# Patient Record
Sex: MALE | Race: WHITE | NOT HISPANIC OR LATINO | Employment: FULL TIME | URBAN - METROPOLITAN AREA
[De-identification: names, ages, dates, MRNs, and addresses within clinical notes are randomized per-mention and may not be internally consistent; named-entity substitution may affect disease eponyms.]

---

## 2019-08-28 ENCOUNTER — OFFICE VISIT (OUTPATIENT)
Dept: URGENT CARE | Facility: CLINIC | Age: 39
End: 2019-08-28
Payer: COMMERCIAL

## 2019-08-28 VITALS
SYSTOLIC BLOOD PRESSURE: 127 MMHG | OXYGEN SATURATION: 98 % | WEIGHT: 150 LBS | BODY MASS INDEX: 20.32 KG/M2 | HEART RATE: 64 BPM | TEMPERATURE: 98 F | DIASTOLIC BLOOD PRESSURE: 70 MMHG | HEIGHT: 72 IN

## 2019-08-28 DIAGNOSIS — S62.346A CLOSED NONDISPLACED FRACTURE OF BASE OF FIFTH METACARPAL BONE OF RIGHT HAND, INITIAL ENCOUNTER: Primary | ICD-10-CM

## 2019-08-28 PROCEDURE — 3008F BODY MASS INDEX DOCD: CPT | Mod: CPTII,S$GLB,, | Performed by: PHYSICIAN ASSISTANT

## 2019-08-28 PROCEDURE — 99203 PR OFFICE/OUTPT VISIT, NEW, LEVL III, 30-44 MIN: ICD-10-PCS | Mod: S$GLB,,, | Performed by: PHYSICIAN ASSISTANT

## 2019-08-28 PROCEDURE — 99203 OFFICE O/P NEW LOW 30 MIN: CPT | Mod: S$GLB,,, | Performed by: PHYSICIAN ASSISTANT

## 2019-08-28 PROCEDURE — 3008F PR BODY MASS INDEX (BMI) DOCUMENTED: ICD-10-PCS | Mod: CPTII,S$GLB,, | Performed by: PHYSICIAN ASSISTANT

## 2019-08-28 PROCEDURE — 73130 XR HAND COMPLETE 3 VIEW RIGHT: ICD-10-PCS | Mod: RT,S$GLB,, | Performed by: RADIOLOGY

## 2019-08-28 PROCEDURE — 73130 X-RAY EXAM OF HAND: CPT | Mod: RT,S$GLB,, | Performed by: RADIOLOGY

## 2019-08-28 RX ORDER — HYDROCODONE BITARTRATE AND ACETAMINOPHEN 5; 325 MG/1; MG/1
1 TABLET ORAL EVERY 6 HOURS PRN
Qty: 10 TABLET | Refills: 0 | Status: SHIPPED | OUTPATIENT
Start: 2019-08-28 | End: 2020-01-02 | Stop reason: ALTCHOICE

## 2019-08-29 ENCOUNTER — TELEPHONE (OUTPATIENT)
Dept: ORTHOPEDICS | Facility: CLINIC | Age: 39
End: 2019-08-29

## 2019-08-29 NOTE — TELEPHONE ENCOUNTER
Patient was called as another attempt to schedule an appointment. Home phone did not have a mailbox.

## 2019-08-29 NOTE — TELEPHONE ENCOUNTER
Patient was called as a follow up regarding his fracture and scheduling him to see one of our providers. I was able to leave a message and will make another attempt to contact the patient.

## 2019-08-29 NOTE — PROGRESS NOTES
Subjective:       Patient ID: Daysi Dickey is a 38 y.o. male.    Vitals:  height is 6' (1.829 m) and weight is 68 kg (150 lb). His temperature is 98.1 °F (36.7 °C). His blood pressure is 127/70 and his pulse is 64. His oxygen saturation is 98%.     Chief Complaint: Hand Injury    Pt is a right-handed male who presents today with right hand pain. He states that a week and a half ago he punched a wall with his right hand, he is c/o pain and swelling. No wounds.       Hand Injury    His dominant hand is their right hand. The incident occurred 5 to 7 days ago. The incident occurred at home. The injury mechanism was a direct blow. The pain is present in the right hand. The pain is at a severity of 6/10. The pain is mild. The pain has been worsening since the incident. The symptoms are aggravated by palpation and movement. He has tried nothing for the symptoms. The treatment provided mild relief.       Constitution: Negative for fatigue.   HENT: Negative for facial swelling and facial trauma.    Neck: Negative for neck stiffness.   Cardiovascular: Negative for chest trauma.   Eyes: Negative for eye trauma, double vision and blurred vision.   Gastrointestinal: Negative for abdominal trauma, abdominal pain and rectal bleeding.   Genitourinary: Negative for hematuria, genital trauma and pelvic pain.   Musculoskeletal: Positive for pain. Negative for trauma, joint swelling, abnormal ROM of joint and pain with walking.   Skin: Negative for color change, wound, abrasion and laceration.   Neurological: Negative for dizziness, history of vertigo, light-headedness, coordination disturbances, altered mental status and loss of consciousness.   Hematologic/Lymphatic: Negative for history of bleeding disorder.   Psychiatric/Behavioral: Negative for altered mental status.       Objective:      Physical Exam   Constitutional: He is oriented to person, place, and time. He appears well-developed and well-nourished. He is cooperative.   Non-toxic appearance. He does not appear ill. No distress.   HENT:   Head: Normocephalic and atraumatic. Head is without abrasion, without contusion and without laceration.   Right Ear: Hearing, tympanic membrane, external ear and ear canal normal. No hemotympanum.   Left Ear: Hearing, tympanic membrane, external ear and ear canal normal. No hemotympanum.   Nose: Nose normal. No mucosal edema, rhinorrhea or nasal deformity. No epistaxis. Right sinus exhibits no maxillary sinus tenderness and no frontal sinus tenderness. Left sinus exhibits no maxillary sinus tenderness and no frontal sinus tenderness.   Mouth/Throat: Uvula is midline, oropharynx is clear and moist and mucous membranes are normal. No trismus in the jaw. Normal dentition. No uvula swelling. No posterior oropharyngeal erythema.   Eyes: Pupils are equal, round, and reactive to light. Conjunctivae, EOM and lids are normal. Right eye exhibits no discharge. Left eye exhibits no discharge. No scleral icterus.   Sclera clear bilat   Neck: Trachea normal, normal range of motion, full passive range of motion without pain and phonation normal. Neck supple. No spinous process tenderness and no muscular tenderness present. No neck rigidity. No tracheal deviation present.   Cardiovascular: Normal rate, regular rhythm, normal heart sounds, intact distal pulses and normal pulses.   Pulmonary/Chest: Effort normal and breath sounds normal. No respiratory distress.   Abdominal: Soft. Normal appearance and bowel sounds are normal. He exhibits no distension, no pulsatile midline mass and no mass. There is no tenderness.   Musculoskeletal: Normal range of motion. He exhibits no edema.        Right wrist: Normal. He exhibits normal range of motion, no tenderness, no bony tenderness, no swelling, no effusion, no crepitus, no deformity and no laceration.        Right hand: He exhibits tenderness, bony tenderness, deformity and swelling. He exhibits normal range of motion,  normal two-point discrimination, normal capillary refill and no laceration. Normal sensation noted. Normal strength noted.        Hands:  Neurological: He is alert and oriented to person, place, and time. He has normal strength. No cranial nerve deficit or sensory deficit. He exhibits normal muscle tone. He displays no seizure activity. Coordination normal. GCS eye subscore is 4. GCS verbal subscore is 5. GCS motor subscore is 6.   Skin: Skin is warm, dry and intact. Capillary refill takes less than 2 seconds. No abrasion, no bruising, no burn, no ecchymosis and no laceration noted. He is not diaphoretic. No pallor.   Psychiatric: He has a normal mood and affect. His speech is normal and behavior is normal. Judgment and thought content normal. Cognition and memory are normal.   Nursing note and vitals reviewed.        Put patient in a temporary knuckle orthosis to keep in place until seen by hand surgery, to serve as ulnar gutter brace with good immobilization of right 5th finger.  Instructed to call tomorrow morning to schedule appointment with hand surgery, I placed an urgent referral.      Assessment:       1. Closed nondisplaced fracture of base of fifth metacarpal bone of right hand, initial encounter        Plan:         Closed nondisplaced fracture of base of fifth metacarpal bone of right hand, initial encounter  -     X-Ray Hand 3 View Right; Future; Expected date: 08/28/2019  -     Ambulatory referral to Hand Surgery  -     HYDROcodone-acetaminophen (NORCO) 5-325 mg per tablet; Take 1 tablet by mouth every 6 (six) hours as needed for Pain.  Dispense: 10 tablet; Refill: 0  -     SPLINT FOR HOME USE      Patient Instructions     - Rest.    - Drink plenty of fluids.    - Tylenol or Ibuprofen as directed as needed for fever/pain. Avoid tylenol if you have a history of liver disease. Do not take ibuprofen if you have a history of GI bleeding, kidney disease, or if you take blood thinners.      --hydrocodone-acetaminophen is a narcotic pain medication  - Please be aware as we discussed that narcotics can be addictive.   - I have given you a limited quantity to take as it is needed at this time. However take it sparingly and only when needed.    - Do not operate machinery or drive on this medication.      - Follow up with the hand specialist in the next 2-3 days.  Call [phone] to schedule an appointment first thing in the morning.   - Go to the ER or seek medical attention immediately if you develop new or worsening symptoms.    - You must understand that you have received an Urgent Care treatment only and that you may be released before all of your medical problems are known or treated.   - You, the patient, will arrange for follow up care as instructed.   - If your condition worsens or fails to improve we recommend that you receive another evaluation at the ER immediately or contact your PCP to discuss your concerns or return here.         Closed Hand Fracture (Adult)  You have a fracture, or broken bone, in your hand. This may be a small crack or chip in the bone. Or it may be a major break with the broken parts pushed out of place. A closed fracture means that the broken bone has not gone through the skin. A hand fracture is treated with a splint or cast. It usually takes 4 to 6 weeks to heal. Severe injuries may require surgery.     Home care  · Keep your arm elevated to reduce pain and swelling. When sitting or lying down, elevate your arm above the level of your heart. You can do this by placing your arm on a pillow that rests on your chest or on a pillow at your side. This is most important during the first 48 hours after injury.  · Apply an ice pack over the injured area for no more than 15 to 20 minutes. Do this every 1 to 2 hours for the first 24 to 48 hours. Continue with ice packs as needed to ease pain and swelling. To make an ice pack, put ice cubes in a plastic bag that seals at  the top. Wrap the bag in a clean, thin towel or cloth. Never put ice or an ice pack directly on the skin. You can place the ice pack inside the sling and directly over the cast or splint. As the ice melts, be careful that the cast or splint doesnt get wet.  · Keep the cast or splint completely dry at all times. Bathe with your cast or splint out of the water, protected with 2 large plastic bags. Place 1 bag outside the other. Tape each bag with duct tape at the top end. If a fiberglass cast or splint gets wet, dry it with a hair dryer on a cool setting.  · You may use over-the-counter pain medicine to control pain, unless another pain medicine was prescribed. If you have chronic liver or kidney disease or ever had a stomach ulcer or GI bleeding, talk with your provider beforeusing these medicines.  Follow-up care  Follow up with your healthcare provider within 1 week, or as advised. This is to be sure the bone is healing properly. If you were given a splint, it may be changed to a cast at your follow-up visit.  If X-rays were taken, you will be told of any new findings that may affect your care.  When to seek medical advice  Call your healthcare provider right away if any of these occur:  · The plaster cast or splint becomes wet or soft  · The fiberglass cast or splint stays wet for more than 24 hours  · The cast has a bad smell  · The plaster cast or splint becomes loose  · There is increased tightness or pain under the cast or splint  · The fingers on your injured hand become swollen, cold, blue, numb, or tingly  Date Last Reviewed: 12/3/2015  © 3486-6285 [employer]. [address] [place] [address], [place] [address]. All rights reserved. This information is not intended as a substitute for professional medical care. Always follow your healthcare professional's instructions.        Boxer Fracture  You have a fracture, or break, of one of the bones in your hand. This causes pain, swelling, and sometimes  bruising. This injury is treated with a splint or cast. It takes about 4 to 6 weeks to heal. Surgery may be needed for severe injuries.   If there are wounds near the fractured joint from hitting someone in the mouth, antibiotics may be required to prevent an infection. After the bone has healed, it is common for one knuckle to be slightly lower than the others, even if the bone was set. This may be seen only when you make a fist. It usually wont affect hand function.  Home care  · Keep your arm elevated to reduce pain and swelling. When sitting or lying down, elevate your arm above the level of your heart. You can do this by placing your arm on a pillow that rests on your chest or on a pillow at your side. This is most important during the first 48 hours after injury.  · Apply an ice pack over the injured area for no more than 20 minutes. Do this every 3 to 6 hours for the first 24 to 48 hours. To make an ice pack, put ice cubes in a plastic bag that seals at the top. Wrap the bag in a clean, thin towel or cloth. Never put ice or an ice pack directly on the skin. You can place the ice pack inside the sling and directly over the splint or cast. As the ice melts, be careful that the cast or splint doesnt get wet.  · Keep the cast or splint dry at all times. Bathe with your cast or splint out of the water, protected with 2 large plastic bags. Place 1 bag around the other. Tape each bag with duct tape at the top end. Even when the cast or splint is covered, water can leak in. So it's best to keep the cast or splint away from water. If a fiberglass cast or splint gets wet, you can dry it with a hair dryer on a cool setting.  · You may use over-the-counter pain medicine to control pain, unless another pain medicine was prescribed. Talk with your providerbefore using these medicines if you have chronic liver or kidney disease, or ever had a stomach ulcer or GI bleeding.  · If you cut, punctured, or scraped your hand  during this injury, there is a risk of infection. Watch for signs of infection listed below. Finish any antibiotics prescribed.  Follow-up care  Follow up with your healthcare provider within 1 week, or as advised. This is to be sure the bone is healing as it should.   If X-rays were taken, you will be told of any new findings that may affect your care.  When to seek medical advice  Call your healthcare provider right away if any of these occur:  · The cast or splint becomes wet or soft  · The cast becomes loose  · There is increased tightness or pain under the cast or splint  · Your fingers become swollen, cold, blue, numb or tingly  · The splint or cast has a bad smell, or wound drainage stains the cast  · You have signs of infection: Fever, redness, warmth, swelling, or drainage from the wound  · You have a fever of 100.4°F (38°C) or above lasting for 24 to 48 hours  Date Last Reviewed: 11/25/2015  © 0471-7907 [employer]. [address] [place] [address]. All rights reserved. This information is not intended as a substitute for professional medical care. Always follow your healthcare professional's instructions.        Boxers Fracture  A boxers fracture is a break in a bone in outer edge of the hand. The bone is under the pinky finger bones. Boxers fracture gets its name because it is often caused by punching a hard surface with the fist.  Understanding the bones of the hand  The bones of your hand are called metacarpal bones. They connect the bones of your fingers (phalanges) to the bones of your wrist (carpals). The fifth metacarpal is the metacarpal of the fifth finger (pinky). A metacarpal bone has a long section of the bone (shaft) connected to the end of the bone. The area where the shaft connects to the end of the bone is called the neck. The neck is the weakest point of the bone. This is where a boxers fracture happens.  What causes boxers fracture?  You may have a boxers  fracture from an injury. This most often happens from punching a hard object, such as a punching bag, wall, or other firm surface. You may also get a boxers fracture if you fall on your closed fist.  Symptoms of boxers fracture  Symptoms of a boxers fracture can include:  · Painful bruising and swelling of the hand  · Bent, claw-like pinky finger that is out of its normal position  · Limited movement (range of motion) of the ring (fourth) and pinky fingers  · Change in the shape of the knuckle  Diagnosing boxers fracture  Your healthcare provider will ask about your symptoms and about how you injured the hand. He or she will also examine your hand. You may have an X-ray of the hand. This uses a small amount of radiation to create an image of the bones.  Treatment for boxers fracture  Your healthcare provider may refer you to an orthopedist. This is a doctor who treats hand problems. Your treatment may first include:  · Cleaning any cuts  · A tetanus vaccine, if you have cuts  · Resting your hand and keeping it raised (elevated) as much as possible for a few days  · Putting ice on it throughout the day  · Taking prescription or over-the-counter pain medicine  · Wearing a splint for several weeks  You may need to have your bones put back into position. You may have a local pain medicine to keep you from feeling pain during this process. Your doctor will then move the bones back into place.  Surgery for boxers fracture  You may need surgery. This is done by an orthopedic surgeon. The surgeon makes a cut (incision) in the skin in order to put your bones back into place. You may need surgery if:  · The bone has broken through the skin  · The bone is broken in several places  · You use your hands and fingers for your work. For example, if you are a musician, craftsman, or .  · Your hand doesnt heal normally  After surgery, you may have physical therapy to help you heal. The therapy includes treatments and  exercises.  What happens if you dont get treated?  An untreated boxers fracture can cause problems such as:  · You may be less able to  objects.  · You may not be able to move your hand or finger as much as you did before the injury.  · Your finger may not look normal.  Preventing boxers fracture  If you box, make sure you use the correct technique and the proper equipment.  How to manage boxers fracture  Your healthcare provider may tell you to:  · Keep your splint from getting wet.  · Keep your bones strong and help your fracture heal. Eat foods with vitamin D, calcium, and protein.  · Stop smoking. If you smoke, your fracture may not heal as quickly or properly.  · Be careful while your hand heals. You may need to use a brace for a while.     When to call the healthcare provider  Call your healthcare provider right away if you have any of these:  · Numbness or tingling in your fingers  · Fingers that look blue  · Pain or swelling that gets worse  · Problems with your splint  · Skin in the area is that is red, painful, or swollen   Date Last Reviewed: 4/1/2017  © 1102-6057 [employer]. [address] [place] [address]. All rights reserved. This information is not intended as a substitute for professional medical care. Always follow your healthcare professional's instructions.

## 2019-08-29 NOTE — TELEPHONE ENCOUNTER
Patient was referred to  via ED for a hand fracture that happened 8/28/19. An appointment was scheduled with patient for 9/3/2019.

## 2019-08-29 NOTE — PATIENT INSTRUCTIONS
- Rest.    - Drink plenty of fluids.    - Tylenol or Ibuprofen as directed as needed for fever/pain. Avoid tylenol if you have a history of liver disease. Do not take ibuprofen if you have a history of GI bleeding, kidney disease, or if you take blood thinners.     --hydrocodone-acetaminophen is a narcotic pain medication  - Please be aware as we discussed that narcotics can be addictive.   - I have given you a limited quantity to take as it is needed at this time. However take it sparingly and only when needed.    - Do not operate machinery or drive on this medication.      - Follow up with the hand specialist in the next 2-3 days.  Call [phone] to schedule an appointment first thing in the morning.   - Go to the ER or seek medical attention immediately if you develop new or worsening symptoms.    - You must understand that you have received an Urgent Care treatment only and that you may be released before all of your medical problems are known or treated.   - You, the patient, will arrange for follow up care as instructed.   - If your condition worsens or fails to improve we recommend that you receive another evaluation at the ER immediately or contact your PCP to discuss your concerns or return here.         Closed Hand Fracture (Adult)  You have a fracture, or broken bone, in your hand. This may be a small crack or chip in the bone. Or it may be a major break with the broken parts pushed out of place. A closed fracture means that the broken bone has not gone through the skin. A hand fracture is treated with a splint or cast. It usually takes 4 to 6 weeks to heal. Severe injuries may require surgery.     Home care  · Keep your arm elevated to reduce pain and swelling. When sitting or lying down, elevate your arm above the level of your heart. You can do this by placing your arm on a pillow that rests on your chest or on a pillow at your side. This is most important during the first 48 hours after  injury.  · Apply an ice pack over the injured area for no more than 15 to 20 minutes. Do this every 1 to 2 hours for the first 24 to 48 hours. Continue with ice packs as needed to ease pain and swelling. To make an ice pack, put ice cubes in a plastic bag that seals at the top. Wrap the bag in a clean, thin towel or cloth. Never put ice or an ice pack directly on the skin. You can place the ice pack inside the sling and directly over the cast or splint. As the ice melts, be careful that the cast or splint doesnt get wet.  · Keep the cast or splint completely dry at all times. Bathe with your cast or splint out of the water, protected with 2 large plastic bags. Place 1 bag outside the other. Tape each bag with duct tape at the top end. If a fiberglass cast or splint gets wet, dry it with a hair dryer on a cool setting.  · You may use over-the-counter pain medicine to control pain, unless another pain medicine was prescribed. If you have chronic liver or kidney disease or ever had a stomach ulcer or GI bleeding, talk with your provider beforeusing these medicines.  Follow-up care  Follow up with your healthcare provider within 1 week, or as advised. This is to be sure the bone is healing properly. If you were given a splint, it may be changed to a cast at your follow-up visit.  If X-rays were taken, you will be told of any new findings that may affect your care.  When to seek medical advice  Call your healthcare provider right away if any of these occur:  · The plaster cast or splint becomes wet or soft  · The fiberglass cast or splint stays wet for more than 24 hours  · The cast has a bad smell  · The plaster cast or splint becomes loose  · There is increased tightness or pain under the cast or splint  · The fingers on your injured hand become swollen, cold, blue, numb, or tingly  Date Last Reviewed: 12/3/2015  © 7330-7274 [employer]. [address] [place] [address], [place] [address]. All rights reserved.  This information is not intended as a substitute for professional medical care. Always follow your healthcare professional's instructions.        Boxer Fracture  You have a fracture, or break, of one of the bones in your hand. This causes pain, swelling, and sometimes bruising. This injury is treated with a splint or cast. It takes about 4 to 6 weeks to heal. Surgery may be needed for severe injuries.   If there are wounds near the fractured joint from hitting someone in the mouth, antibiotics may be required to prevent an infection. After the bone has healed, it is common for one knuckle to be slightly lower than the others, even if the bone was set. This may be seen only when you make a fist. It usually wont affect hand function.  Home care  · Keep your arm elevated to reduce pain and swelling. When sitting or lying down, elevate your arm above the level of your heart. You can do this by placing your arm on a pillow that rests on your chest or on a pillow at your side. This is most important during the first 48 hours after injury.  · Apply an ice pack over the injured area for no more than 20 minutes. Do this every 3 to 6 hours for the first 24 to 48 hours. To make an ice pack, put ice cubes in a plastic bag that seals at the top. Wrap the bag in a clean, thin towel or cloth. Never put ice or an ice pack directly on the skin. You can place the ice pack inside the sling and directly over the splint or cast. As the ice melts, be careful that the cast or splint doesnt get wet.  · Keep the cast or splint dry at all times. Bathe with your cast or splint out of the water, protected with 2 large plastic bags. Place 1 bag around the other. Tape each bag with duct tape at the top end. Even when the cast or splint is covered, water can leak in. So it's best to keep the cast or splint away from water. If a fiberglass cast or splint gets wet, you can dry it with a hair dryer on a cool setting.  · You may  use over-the-counter pain medicine to control pain, unless another pain medicine was prescribed. Talk with your providerbefore using these medicines if you have chronic liver or kidney disease, or ever had a stomach ulcer or GI bleeding.  · If you cut, punctured, or scraped your hand during this injury, there is a risk of infection. Watch for signs of infection listed below. Finish any antibiotics prescribed.  Follow-up care  Follow up with your healthcare provider within 1 week, or as advised. This is to be sure the bone is healing as it should.   If X-rays were taken, you will be told of any new findings that may affect your care.  When to seek medical advice  Call your healthcare provider right away if any of these occur:  · The cast or splint becomes wet or soft  · The cast becomes loose  · There is increased tightness or pain under the cast or splint  · Your fingers become swollen, cold, blue, numb or tingly  · The splint or cast has a bad smell, or wound drainage stains the cast  · You have signs of infection: Fever, redness, warmth, swelling, or drainage from the wound  · You have a fever of 100.4°F (38°C) or above lasting for 24 to 48 hours  Date Last Reviewed: 11/25/2015  © 4439-0792 [employer]. [address] [place] [address], [place] [address]. All rights reserved. This information is not intended as a substitute for professional medical care. Always follow your healthcare professional's instructions.        Boxers Fracture  A boxers fracture is a break in a bone in outer edge of the hand. The bone is under the pinky finger bones. Boxers fracture gets its name because it is often caused by punching a hard surface with the fist.  Understanding the bones of the hand  The bones of your hand are called metacarpal bones. They connect the bones of your fingers (phalanges) to the bones of your wrist (carpals). The fifth metacarpal is the metacarpal of the fifth finger (pinky). A metacarpal bone has a  long section of the bone (shaft) connected to the end of the bone. The area where the shaft connects to the end of the bone is called the neck. The neck is the weakest point of the bone. This is where a boxers fracture happens.  What causes boxers fracture?  You may have a boxers fracture from an injury. This most often happens from punching a hard object, such as a punching bag, wall, or other firm surface. You may also get a boxers fracture if you fall on your closed fist.  Symptoms of boxers fracture  Symptoms of a boxers fracture can include:  · Painful bruising and swelling of the hand  · Bent, claw-like pinky finger that is out of its normal position  · Limited movement (range of motion) of the ring (fourth) and pinky fingers  · Change in the shape of the knuckle  Diagnosing boxers fracture  Your healthcare provider will ask about your symptoms and about how you injured the hand. He or she will also examine your hand. You may have an X-ray of the hand. This uses a small amount of radiation to create an image of the bones.  Treatment for boxers fracture  Your healthcare provider may refer you to an orthopedist. This is a doctor who treats hand problems. Your treatment may first include:  · Cleaning any cuts  · A tetanus vaccine, if you have cuts  · Resting your hand and keeping it raised (elevated) as much as possible for a few days  · Putting ice on it throughout the day  · Taking prescription or over-the-counter pain medicine  · Wearing a splint for several weeks  You may need to have your bones put back into position. You may have a local pain medicine to keep you from feeling pain during this process. Your doctor will then move the bones back into place.  Surgery for boxers fracture  You may need surgery. This is done by an orthopedic surgeon. The surgeon makes a cut (incision) in the skin in order to put your bones back into place. You may need surgery if:  · The bone has broken through the  skin  · The bone is broken in several places  · You use your hands and fingers for your work. For example, if you are a musician, craftsman, or .  · Your hand doesnt heal normally  After surgery, you may have physical therapy to help you heal. The therapy includes treatments and exercises.  What happens if you dont get treated?  An untreated boxers fracture can cause problems such as:  · You may be less able to  objects.  · You may not be able to move your hand or finger as much as you did before the injury.  · Your finger may not look normal.  Preventing boxers fracture  If you box, make sure you use the correct technique and the proper equipment.  How to manage boxers fracture  Your healthcare provider may tell you to:  · Keep your splint from getting wet.  · Keep your bones strong and help your fracture heal. Eat foods with vitamin D, calcium, and protein.  · Stop smoking. If you smoke, your fracture may not heal as quickly or properly.  · Be careful while your hand heals. You may need to use a brace for a while.     When to call the healthcare provider  Call your healthcare provider right away if you have any of these:  · Numbness or tingling in your fingers  · Fingers that look blue  · Pain or swelling that gets worse  · Problems with your splint  · Skin in the area is that is red, painful, or swollen   Date Last Reviewed: 4/1/2017  © 4779-2148 [employer]. [address] [place] [address]. All rights reserved. This information is not intended as a substitute for professional medical care. Always follow your healthcare professional's instructions.

## 2019-08-30 ENCOUNTER — TELEPHONE (OUTPATIENT)
Dept: ORTHOPEDICS | Facility: CLINIC | Age: 39
End: 2019-08-30

## 2020-01-02 ENCOUNTER — OFFICE VISIT (OUTPATIENT)
Dept: ORTHOPEDICS | Facility: CLINIC | Age: 40
End: 2020-01-02
Payer: COMMERCIAL

## 2020-01-02 ENCOUNTER — OFFICE VISIT (OUTPATIENT)
Dept: URGENT CARE | Facility: CLINIC | Age: 40
End: 2020-01-02
Payer: COMMERCIAL

## 2020-01-02 VITALS
WEIGHT: 150 LBS | SYSTOLIC BLOOD PRESSURE: 136 MMHG | TEMPERATURE: 97 F | BODY MASS INDEX: 20.32 KG/M2 | HEIGHT: 72 IN | RESPIRATION RATE: 18 BRPM | OXYGEN SATURATION: 100 % | HEART RATE: 103 BPM | DIASTOLIC BLOOD PRESSURE: 85 MMHG

## 2020-01-02 VITALS — WEIGHT: 149.94 LBS | HEIGHT: 72 IN | BODY MASS INDEX: 20.31 KG/M2

## 2020-01-02 DIAGNOSIS — S90.31XA CONTUSION OF RIGHT FOOT, INITIAL ENCOUNTER: Primary | ICD-10-CM

## 2020-01-02 DIAGNOSIS — S93.601A RIGHT FOOT SPRAIN, INITIAL ENCOUNTER: Primary | ICD-10-CM

## 2020-01-02 PROCEDURE — 99213 PR OFFICE/OUTPT VISIT, EST, LEVL III, 20-29 MIN: ICD-10-PCS | Mod: 25,S$GLB,, | Performed by: INTERNAL MEDICINE

## 2020-01-02 PROCEDURE — 3008F PR BODY MASS INDEX (BMI) DOCUMENTED: ICD-10-PCS | Mod: CPTII,S$GLB,, | Performed by: PHYSICIAN ASSISTANT

## 2020-01-02 PROCEDURE — 99203 PR OFFICE/OUTPT VISIT, NEW, LEVL III, 30-44 MIN: ICD-10-PCS | Mod: S$GLB,,, | Performed by: PHYSICIAN ASSISTANT

## 2020-01-02 PROCEDURE — 96372 PR INJECTION,THERAP/PROPH/DIAG2ST, IM OR SUBCUT: ICD-10-PCS | Mod: S$GLB,,, | Performed by: INTERNAL MEDICINE

## 2020-01-02 PROCEDURE — 99999 PR PBB SHADOW E&M-EST. PATIENT-LVL III: CPT | Mod: PBBFAC,,, | Performed by: PHYSICIAN ASSISTANT

## 2020-01-02 PROCEDURE — 99213 OFFICE O/P EST LOW 20 MIN: CPT | Mod: 25,S$GLB,, | Performed by: INTERNAL MEDICINE

## 2020-01-02 PROCEDURE — 3008F BODY MASS INDEX DOCD: CPT | Mod: CPTII,S$GLB,, | Performed by: PHYSICIAN ASSISTANT

## 2020-01-02 PROCEDURE — 73630 X-RAY EXAM OF FOOT: CPT | Mod: RT,S$GLB,, | Performed by: RADIOLOGY

## 2020-01-02 PROCEDURE — 99999 PR PBB SHADOW E&M-EST. PATIENT-LVL III: ICD-10-PCS | Mod: PBBFAC,,, | Performed by: PHYSICIAN ASSISTANT

## 2020-01-02 PROCEDURE — 96372 THER/PROPH/DIAG INJ SC/IM: CPT | Mod: S$GLB,,, | Performed by: INTERNAL MEDICINE

## 2020-01-02 PROCEDURE — 73630 XR FOOT COMPLETE 3 VIEW RIGHT: ICD-10-PCS | Mod: RT,S$GLB,, | Performed by: RADIOLOGY

## 2020-01-02 PROCEDURE — 99203 OFFICE O/P NEW LOW 30 MIN: CPT | Mod: S$GLB,,, | Performed by: PHYSICIAN ASSISTANT

## 2020-01-02 RX ORDER — DICLOFENAC SODIUM 10 MG/G
2 GEL TOPICAL 4 TIMES DAILY
Qty: 100 G | Refills: 0 | Status: SHIPPED | OUTPATIENT
Start: 2020-01-02

## 2020-01-02 RX ORDER — NAPROXEN 500 MG/1
500 TABLET ORAL 2 TIMES DAILY WITH MEALS
Qty: 60 TABLET | Refills: 0 | Status: SHIPPED | OUTPATIENT
Start: 2020-01-02 | End: 2020-02-01

## 2020-01-02 RX ORDER — KETOROLAC TROMETHAMINE 30 MG/ML
15 INJECTION, SOLUTION INTRAMUSCULAR; INTRAVENOUS
Status: COMPLETED | OUTPATIENT
Start: 2020-01-02 | End: 2020-01-02

## 2020-01-02 RX ORDER — KETOROLAC TROMETHAMINE 30 MG/ML
30 INJECTION, SOLUTION INTRAMUSCULAR; INTRAVENOUS
Status: DISCONTINUED | OUTPATIENT
Start: 2020-01-02 | End: 2020-01-02

## 2020-01-02 RX ADMIN — KETOROLAC TROMETHAMINE 15 MG: 30 INJECTION, SOLUTION INTRAMUSCULAR; INTRAVENOUS at 01:01

## 2020-01-02 NOTE — PROGRESS NOTES
"Subjective:       Patient ID: Daysi Dickey is a 39 y.o. male.    Vitals:  height is 6' (1.829 m) and weight is 68 kg (150 lb). His temperature is 97.1 °F (36.2 °C). His blood pressure is 136/85 and his pulse is 103. His respiration is 18 and oxygen saturation is 100%.     Chief Complaint: Foot Injury    38 y/o male with no known PMHx presents to urgent care c/o acute right foot/ankle pain and swelling that started at 8pm last night after he tried kicking the front door open and instead "hit the studs." Pain is constant, 8/10, described as "throbbing" and worse with movement. Pt reports previous tib/fib fracture and previous ORIF from 2003. Pt did not take anything OTC for symptoms. Pt denies fever, chills, N/V, numbness, weakness, and tingling.    Foot Injury    The incident occurred 3 to 6 hours ago. The incident occurred at home. The injury mechanism was a direct blow. The pain is present in the right foot. The pain is at a severity of 10/10 (throbbing ). The pain is severe. The pain has been constant since onset. Associated symptoms include an inability to bear weight and a loss of motion. Pertinent negatives include no numbness. The symptoms are aggravated by weight bearing, palpation and movement. He has tried NSAIDs for the symptoms. The treatment provided no relief.       Constitution: Negative for fatigue.   HENT: Negative for facial swelling and facial trauma.    Neck: Negative for neck stiffness.   Cardiovascular: Negative for chest trauma.   Eyes: Negative for eye trauma, double vision and blurred vision.   Gastrointestinal: Negative for abdominal trauma, abdominal pain, nausea, vomiting and rectal bleeding.   Genitourinary: Negative for hematuria, genital trauma and pelvic pain.   Musculoskeletal: Positive for joint pain and joint swelling. Negative for pain, trauma, abnormal ROM of joint and pain with walking.   Skin: Negative for color change, wound, abrasion and laceration.   Neurological: " Negative for dizziness, history of vertigo, light-headedness, coordination disturbances, altered mental status, loss of consciousness, numbness and tingling.   Hematologic/Lymphatic: Negative for history of bleeding disorder.   Psychiatric/Behavioral: Negative for altered mental status.       Objective:      Physical Exam   Constitutional: He is oriented to person, place, and time. He appears well-developed and well-nourished. He is cooperative.  Non-toxic appearance. He does not appear ill. No distress.   HENT:   Head: Normocephalic and atraumatic. Head is without abrasion, without contusion and without laceration.   Right Ear: Hearing, tympanic membrane, external ear and ear canal normal. No hemotympanum.   Left Ear: Hearing, tympanic membrane, external ear and ear canal normal. No hemotympanum.   Nose: Nose normal. No mucosal edema, rhinorrhea or nasal deformity. No epistaxis. Right sinus exhibits no maxillary sinus tenderness and no frontal sinus tenderness. Left sinus exhibits no maxillary sinus tenderness and no frontal sinus tenderness.   Mouth/Throat: Uvula is midline, oropharynx is clear and moist and mucous membranes are normal. No trismus in the jaw. Normal dentition. No uvula swelling. No posterior oropharyngeal erythema.   Eyes: Pupils are equal, round, and reactive to light. Conjunctivae, EOM and lids are normal. Right eye exhibits no discharge. Left eye exhibits no discharge. No scleral icterus.   Neck: Trachea normal, normal range of motion, full passive range of motion without pain and phonation normal. Neck supple. No spinous process tenderness and no muscular tenderness present. No neck rigidity. No tracheal deviation present.   Cardiovascular: Normal rate, regular rhythm, normal heart sounds, intact distal pulses and normal pulses.   Pulses:       Dorsalis pedis pulses are 2+ on the right side, and 2+ on the left side.        Posterior tibial pulses are 2+ on the right side, and 2+ on the left  side.   Pulmonary/Chest: Effort normal and breath sounds normal. No respiratory distress.   Abdominal: Soft. Normal appearance and bowel sounds are normal. He exhibits no distension, no pulsatile midline mass and no mass. There is no tenderness.   Musculoskeletal: He exhibits tenderness (over right achilles tendon + lateral malleolus). He exhibits no edema or deformity.        Right ankle: He exhibits decreased range of motion and swelling. He exhibits no ecchymosis. Tenderness. Lateral malleolus tenderness found. Achilles tendon exhibits pain. Achilles tendon exhibits normal Godinez's test results.   Neurological: He is alert and oriented to person, place, and time. He has normal strength. No cranial nerve deficit or sensory deficit. He exhibits normal muscle tone. He displays no seizure activity. Coordination normal. GCS eye subscore is 4. GCS verbal subscore is 5. GCS motor subscore is 6.   Skin: Skin is warm, dry, intact, not diaphoretic and not pale. Capillary refill takes less than 2 seconds. abrasion, burn, bruising and ecchymosis  Psychiatric: He has a normal mood and affect. His speech is normal and behavior is normal. Judgment and thought content normal. Cognition and memory are normal.   Nursing note and vitals reviewed.        Assessment:       1. Right foot sprain, initial encounter        Plan:         Right foot sprain, initial encounter  -     X-Ray Foot Complete 3 view Right; Future; Expected date: 01/02/2020  -     diclofenac sodium (VOLTAREN) 1 % Gel; Apply 2 g topically 4 (four) times daily.  Dispense: 100 g; Refill: 0  -     Discontinue: ketorolac injection 30 mg  -     ketorolac injection 15 mg  -     Ambulatory referral/consult to Orthopedics  -     IMMOBILIZER FOR HOME USE    X-ray Foot Complete 3 View Right    Result Date: 1/2/2020  EXAMINATION: XR FOOT COMPLETE 3 VIEW RIGHT CLINICAL HISTORY: . Unspecified sprain of right foot, initial encounter TECHNIQUE: AP, lateral, and oblique views of  the right foot were performed. COMPARISON: None FINDINGS: Bones are well mineralized.  Postoperative changes of ORIF are seen at the ankle with a partially visualized metallic plate and multiple screws transfixing the distal tibia and fibula.  There are 2 longer screws which traverse the tibia which demonstrate fractures.  The remainder of the visualized hardware appears intact.  As seen on the lateral view, there is a small triangular ossific density just superior to the distal talus.  This could represent a small avulsion fracture although its age is indeterminate.  Correlation with mechanism of injury and precise location of pain is necessary.  No other evidence of fracture or dislocation is seen.  Degenerative changes are noted, particularly at the dorsal aspect of the midfoot.  No definite soft tissue swelling appreciated.     Status post ORIF at the distal tibia and fibula with plate and screws as detailed above.  Two screws which traverse the distal tibia demonstrate fractures.  Small ossific density adjacent to the dorsal aspect of the distal talus could represent a small avulsion fracture although its age is indeterminate.  Correlation with mechanism of injury and precise location of pain is necessary. This report was flagged in Epic as abnormal. Electronically signed by: Humberto Zhu MD Date:    01/02/2020 Time:    13:26  Patient Instructions       Understanding Ankle Sprain    The ankle is the joint where the leg and foot meet. Bones are held in place by connective tissue called ligaments. When ankle ligaments are stretched to the point of pain and injury, it is called an ankle sprain. A sprain can tear the ligaments. These tears can be very small but still cause pain. Ankle sprains can be mild or severe.  What causes an ankle sprain?  A sprain may occur when you twist your ankle or bend it too far. This can happen when you stumble or fall. Things that can make an ankle sprain more likely  include:  · Having had an ankle sprain before  · Playing sports that involve running and jumping. Or playing contact sports such as football or hockey.  · Wearing shoes that dont support your feet and ankles well  · Having ankles with poor strength and flexibility  Symptoms of an ankle sprain  Symptoms may include:  · Pain or soreness in the ankle  · Swelling  · Redness or bruising  · Not being able to walk or put weight on the affected foot  · Reduced range of motion in the ankle  · A popping or tearing feeling at the time the sprain occurs  · An abnormal or dislocated look to the ankle  · Instability or too much range of motion in the ankle  Treatment for an ankle sprain  Treatment focuses on reducing pain and swelling, and avoiding further injury. Treatments may include:  · Resting the ankle. Avoid putting weight on it. This may mean using crutches until the sprain heals.  · Prescription or over-the-counter pain medicines. These help reduce swelling and pain.  · Cold packs. These help reduce pain and swelling.  · Raising your ankle above your heart. This helps reduce swelling.  · Wrapping the ankle with an elastic bandage or ankle brace. This helps reduce swelling and gives some support to the ankle. In rare cases, you may need a cast or boot.  · Stretching and other exercises. These improve flexibility and strength.  · Heat packs. These may be recommended before doing ankle exercises.  Possible complications of an ankle sprain  An ankle that has been weakened by a sprain can be more likely to have repeated sprains afterward. Doing exercises to strengthen your ankle and improve balance can reduce your risk for repeated sprains. Other possible complications are long-term (chronic) pain or an ankle that remains unstable.  When to call your healthcare provider  Call your healthcare provider right away if you have any of these:  · Fever of 100.4°F (38°C) or higher, or as directed  · Pain, numbness, discoloration, or  coldness in the foot or toes  · Pain that gets worse  · Symptoms that dont get better, or get worse  · New symptoms   Date Last Reviewed: 3/10/2016  © 6246-8967 [employer] [address] [place] [address], [place] [address]. All rights reserved. This information is not intended as a substitute for professional medical care. Always follow your healthcare professional's instructions.      PLEASE READ YOUR DISCHARGE INSTRUCTIONS ENTIRELY AS IT CONTAINS IMPORTANT INFORMATION.    **Right Foot XR showed possible fracture (but unable to tell whether its chronic or new). I made an urgent appointment for you for today 1/2/2020 at 5pm with [facility] and it is very important that you see them as soon as possible. Leave boot in place without removing until you are evaluated by the orthopedic specialist. Pt understands and agrees with this plan.     Please drink plenty of fluids.  Please get plenty of rest.  Ice to the area.   You may do gently stretching if tolerable.    Please return here or go to the Emergency Department for any concerns or worsening of condition.    Take the Ibuprofen with food. Also take an over the counter antacid with it (prilosec, Nexium, Pepcid) to protect your stomach.     Apply voltaren to painful area every 4 hours for pain.    If you lose control or sensation of any extremity, please go to the nearest Emergency Department immediately.      Please follow up with your primary care doctor or orthopedist as needed.    Please arrange follow up with your primary medical clinic as soon as possible. You must understand that you've received an Urgent Care treatment only and that you may be released before all of your medical problems are known or treated. You, the patient, will arrange for follow up as instructed. If your symptoms worsen or fail to improve you should go to the Emergency Room.

## 2020-01-02 NOTE — PATIENT INSTRUCTIONS
Understanding Ankle Sprain    The ankle is the joint where the leg and foot meet. Bones are held in place by connective tissue called ligaments. When ankle ligaments are stretched to the point of pain and injury, it is called an ankle sprain. A sprain can tear the ligaments. These tears can be very small but still cause pain. Ankle sprains can be mild or severe.  What causes an ankle sprain?  A sprain may occur when you twist your ankle or bend it too far. This can happen when you stumble or fall. Things that can make an ankle sprain more likely include:  · Having had an ankle sprain before  · Playing sports that involve running and jumping. Or playing contact sports such as football or hockey.  · Wearing shoes that dont support your feet and ankles well  · Having ankles with poor strength and flexibility  Symptoms of an ankle sprain  Symptoms may include:  · Pain or soreness in the ankle  · Swelling  · Redness or bruising  · Not being able to walk or put weight on the affected foot  · Reduced range of motion in the ankle  · A popping or tearing feeling at the time the sprain occurs  · An abnormal or dislocated look to the ankle  · Instability or too much range of motion in the ankle  Treatment for an ankle sprain  Treatment focuses on reducing pain and swelling, and avoiding further injury. Treatments may include:  · Resting the ankle. Avoid putting weight on it. This may mean using crutches until the sprain heals.  · Prescription or over-the-counter pain medicines. These help reduce swelling and pain.  · Cold packs. These help reduce pain and swelling.  · Raising your ankle above your heart. This helps reduce swelling.  · Wrapping the ankle with an elastic bandage or ankle brace. This helps reduce swelling and gives some support to the ankle. In rare cases, you may need a cast or boot.  · Stretching and other exercises. These improve flexibility and strength.  · Heat packs. These may be recommended before doing  ankle exercises.  Possible complications of an ankle sprain  An ankle that has been weakened by a sprain can be more likely to have repeated sprains afterward. Doing exercises to strengthen your ankle and improve balance can reduce your risk for repeated sprains. Other possible complications are long-term (chronic) pain or an ankle that remains unstable.  When to call your healthcare provider  Call your healthcare provider right away if you have any of these:  · Fever of 100.4°F (38°C) or higher, or as directed  · Pain, numbness, discoloration, or coldness in the foot or toes  · Pain that gets worse  · Symptoms that dont get better, or get worse  · New symptoms   Date Last Reviewed: 3/10/2016  © 2216-9001 [employer]. [address] [place] [address], [place] [address]. All rights reserved. This information is not intended as a substitute for professional medical care. Always follow your healthcare professional's instructions.      PLEASE READ YOUR DISCHARGE INSTRUCTIONS ENTIRELY AS IT CONTAINS IMPORTANT INFORMATION.    **Right Foot XR showed possible fracture (but unable to tell whether its chronic or new). I made an urgent appointment for you for today 1/2/2020 at 5pm with [facility] and it is very important that you see them as soon as possible. Leave boot in place without removing until you are evaluated by the orthopedic specialist. Pt understands and agrees with this plan.     Please drink plenty of fluids.  Please get plenty of rest.  Ice to the area.   You may do gently stretching if tolerable.    Please return here or go to the Emergency Department for any concerns or worsening of condition.    Take the Ibuprofen with food. Also take an over the counter antacid with it (prilosec, Nexium, Pepcid) to protect your stomach.     Apply voltaren to painful area every 4 hours for pain.    If you lose control or sensation of any extremity, please go to the nearest Emergency  Department immediately.      Please follow up with your primary care doctor or orthopedist as needed.    Please arrange follow up with your primary medical clinic as soon as possible. You must understand that you've received an Urgent Care treatment only and that you may be released before all of your medical problems are known or treated. You, the patient, will arrange for follow up as instructed. If your symptoms worsen or fail to improve you should go to the Emergency Room.

## 2020-01-02 NOTE — LETTER
January 2, 2020      [facility]  [address]  [facility] [address]  Phone: [phone]  Fax: [phone]       Patient: Daysi Dickey   YOB: 1980  Date of Visit: 01/02/2020    To Whom It May Concern:    Rhonda Dickey  was at [facility] on 01/02/2020.He may be excuse from work and return to work on 01/07/2020 with no restrictions. If you have any questions or concerns, or if I can be of further assistance, please do not hesitate to contact me.    Sincerely,    Christal Caputo PA-C

## 2020-01-02 NOTE — LETTER
January 2, 2020      [facility] [address], [place] [address]  Phone: [phone]  Fax: [phone]       Patient: Daysi Dickey   YOB: 1980  Date of Visit: 01/02/2020    To Whom It May Concern:    Rhonda Dickey  was at [facility] on 01/02/2020. He may return to work/school on without restrictions. If you have any questions or concerns, or if I can be of further assistance, please do not hesitate to contact me.    Sincerely,    Mei Skinner PA-C

## 2020-01-03 ENCOUNTER — TELEPHONE (OUTPATIENT)
Dept: URGENT CARE | Facility: CLINIC | Age: 40
End: 2020-01-03

## 2020-01-03 NOTE — PROGRESS NOTES
Subjective:      Patient ID: Daysi Dickey is a 39 y.o. male.    Chief Complaint: Pain of the Right Foot and Pain of the Right Ankle    HPI  39 year old male presents with chief complaint of right posterior heel pain since yesterday. He was trying to kick a door with the back of his heel but he missed and hit the studs/foundation instead with the back of his heel. He had immediate pain and heard a crack. Pain is worse with WB. He took ibuprofen yesterday. He reports swelling. He went to urgent care today and was given a short CAM boot. He is using crutches. He had ORIF for right fractured tib/fib in 2003 at [facility]. He is a .   Review of Systems   Constitution: Negative for chills, fever and night sweats.   Cardiovascular: Negative for chest pain.   Respiratory: Negative for cough and shortness of breath.    Hematologic/Lymphatic: Does not bruise/bleed easily.   Skin: Negative for color change.   Gastrointestinal: Negative for heartburn.   Genitourinary: Negative for dysuria.   Neurological: Negative for numbness and paresthesias.   Psychiatric/Behavioral: Negative for altered mental status.   Allergic/Immunologic: Negative for persistent infections.         Objective:            General    Vitals reviewed.  Constitutional: He is oriented to person, place, and time. He appears well-developed and well-nourished.   Cardiovascular: Normal rate.    Neurological: He is alert and oriented to person, place, and time.         Right Ankle/Foot Exam     Inspection   Erythema: absent    Tenderness   The patient is tender to palpation of the Achilles tendon.    Range of Motion   Ankle Joint   Dorsiflexion: abnormal   Plantar flexion: abnormal   Subtalar Joint   Inversion: abnormal   Eversion: abnormal   Godinez Test:  negative    Other   Sensation: normal    Comments:  Swelling present at ankle. Also some tenderness at the anterior ankle.         Vascular Exam     Right Pulses  Dorsalis Pedis:       2+              X-ray: reviewed by myself. No acute fx or dislocation seen.         Assessment:       Encounter Diagnosis   Name Primary?    Contusion of right foot, initial encounter Yes          Plan:       Patient was switched to a tall CAM boot. He is WBAT. Rest, ice and elevate. Naproxen sent to pharmacy. He can use voltaren gel as needed. RTC in 2 weeks for re-evaluation.

## 2020-01-03 NOTE — TELEPHONE ENCOUNTER
Attempted to call patient to see how he was feeling today and ask how his orthopedic appointment went yesterday. I tried all 3 numbers provided and there was no answer or voice mail set up for any of the numbers.

## 2020-01-16 ENCOUNTER — OFFICE VISIT (OUTPATIENT)
Dept: ORTHOPEDICS | Facility: CLINIC | Age: 40
End: 2020-01-16
Payer: COMMERCIAL

## 2020-01-16 VITALS — WEIGHT: 149 LBS | HEIGHT: 72 IN | BODY MASS INDEX: 20.18 KG/M2

## 2020-01-16 DIAGNOSIS — S90.31XD CONTUSION OF RIGHT FOOT, SUBSEQUENT ENCOUNTER: Primary | ICD-10-CM

## 2020-01-16 PROCEDURE — 3008F PR BODY MASS INDEX (BMI) DOCUMENTED: ICD-10-PCS | Mod: CPTII,S$GLB,, | Performed by: PHYSICIAN ASSISTANT

## 2020-01-16 PROCEDURE — 99213 OFFICE O/P EST LOW 20 MIN: CPT | Mod: S$GLB,,, | Performed by: PHYSICIAN ASSISTANT

## 2020-01-16 PROCEDURE — 3008F BODY MASS INDEX DOCD: CPT | Mod: CPTII,S$GLB,, | Performed by: PHYSICIAN ASSISTANT

## 2020-01-16 PROCEDURE — 99999 PR PBB SHADOW E&M-EST. PATIENT-LVL III: ICD-10-PCS | Mod: PBBFAC,,, | Performed by: PHYSICIAN ASSISTANT

## 2020-01-16 PROCEDURE — 99213 PR OFFICE/OUTPT VISIT, EST, LEVL III, 20-29 MIN: ICD-10-PCS | Mod: S$GLB,,, | Performed by: PHYSICIAN ASSISTANT

## 2020-01-16 PROCEDURE — 99999 PR PBB SHADOW E&M-EST. PATIENT-LVL III: CPT | Mod: PBBFAC,,, | Performed by: PHYSICIAN ASSISTANT

## 2020-01-17 NOTE — PROGRESS NOTES
Subjective:      Patient ID: Daysi Dickey is a 39 y.o. male.    Chief Complaint: Pain of the Right Foot    HPI  Patient returns for f/u for his right foot pain that began about 2 weeks ago after he hit the back of his heel against something. He wore the boot for about 5 days. He says it helped. He has been taking the naproxen with little relief. He says the voltaren gel helps. He still has some pain diffuse at the ankle and foot if he makes a sudden, quick movement. He says it feels like it could give way. He says the pain is mostly posterior. It has gotten better since last visit.   Review of Systems   Constitution: Negative for chills, fever and night sweats.   Cardiovascular: Negative for chest pain.   Respiratory: Negative for cough and shortness of breath.    Hematologic/Lymphatic: Does not bruise/bleed easily.   Skin: Negative for color change.   Gastrointestinal: Negative for heartburn.   Genitourinary: Negative for dysuria.   Neurological: Negative for numbness and paresthesias.   Psychiatric/Behavioral: Negative for altered mental status.   Allergic/Immunologic: Negative for persistent infections.         Objective:            General    Vitals reviewed.  Constitutional: He is oriented to person, place, and time. He appears well-developed and well-nourished.   Cardiovascular: Normal rate.    Neurological: He is alert and oriented to person, place, and time.         Right Ankle/Foot Exam     Inspection   Erythema: absent    Tenderness   The patient is tender to palpation of the Achilles tendon.    Range of Motion   The patient has normal right ankle ROM.  Godinez Test:  negative    Other   Sensation: normal    Comments:  Also TTP anterior ankle.         Vascular Exam     Right Pulses  Dorsalis Pedis:      2+                      Assessment:       Encounter Diagnosis   Name Primary?    Contusion of right foot, subsequent encounter Yes          Plan:       Discussed treatment options with patient. He  does not feel that he needs PT. He asks about pain medicine. Explained dept policy regarding pain medicine (only given for 12 weeks post-op or in the setting of fracture) and that I cannot prescribe him that today due to dept policy. Offered to prescribe a medrol dose pack but he declined. He wishes to f/u as needed.

## 2020-02-03 ENCOUNTER — OFFICE VISIT (OUTPATIENT)
Dept: URGENT CARE | Facility: CLINIC | Age: 40
End: 2020-02-03
Payer: COMMERCIAL

## 2020-02-03 VITALS
OXYGEN SATURATION: 99 % | BODY MASS INDEX: 20.18 KG/M2 | HEIGHT: 72 IN | SYSTOLIC BLOOD PRESSURE: 120 MMHG | HEART RATE: 74 BPM | TEMPERATURE: 98 F | WEIGHT: 149 LBS | DIASTOLIC BLOOD PRESSURE: 80 MMHG

## 2020-02-03 DIAGNOSIS — S93.491A SPRAIN OF ANTERIOR TALOFIBULAR LIGAMENT OF RIGHT ANKLE, INITIAL ENCOUNTER: ICD-10-CM

## 2020-02-03 DIAGNOSIS — S99.911A INJURY OF RIGHT ANKLE, INITIAL ENCOUNTER: Primary | ICD-10-CM

## 2020-02-03 PROCEDURE — 99213 PR OFFICE/OUTPT VISIT, EST, LEVL III, 20-29 MIN: ICD-10-PCS | Mod: S$GLB,,, | Performed by: INTERNAL MEDICINE

## 2020-02-03 PROCEDURE — 73610 X-RAY EXAM OF ANKLE: CPT | Mod: RT,S$GLB,, | Performed by: RADIOLOGY

## 2020-02-03 PROCEDURE — 73610 XR ANKLE COMPLETE 3 VIEW RIGHT: ICD-10-PCS | Mod: RT,S$GLB,, | Performed by: RADIOLOGY

## 2020-02-03 PROCEDURE — 99213 OFFICE O/P EST LOW 20 MIN: CPT | Mod: S$GLB,,, | Performed by: INTERNAL MEDICINE

## 2020-02-03 NOTE — LETTER
February 3, 2020      [facility] [address]  [place] [address]  Phone: [phone]  Fax: [phone]       Patient: Daysi Dickey   YOB: 1980  Date of Visit: 02/03/2020    To Whom It May Concern:    Rhonda Dickey  was at [facility] on 02/03/2020. He may return to work/school on 2/4/2020 with no restrictions. If you have any questions or concerns, or if I can be of further assistance, please do not hesitate to contact me.    Sincerely,    Eliza Ge, RT

## 2020-02-04 NOTE — PATIENT INSTRUCTIONS

## 2020-02-04 NOTE — PROGRESS NOTES
Subjective:       Patient ID: Daysi Dickey is a 39 y.o. male.    Vitals:  height is 6' (1.829 m) and weight is 67.6 kg (149 lb). His oxygen saturation is 99%.     Chief Complaint: Ankle Injury (right ankle)    Pt states he was walking down the stairs and missed a step and twisted his right ankle, he needs a drs note     Ankle Injury    The incident occurred 6 to 12 hours ago. The incident occurred at home. The injury mechanism was a twisting injury. The pain is at a severity of 6/10. He reports no foreign bodies present. The symptoms are aggravated by weight bearing. He has tried nothing for the symptoms.       Constitution: Negative for fatigue.   HENT: Negative for facial swelling and facial trauma.    Neck: Negative for neck stiffness.   Cardiovascular: Negative for chest trauma.   Eyes: Negative for eye trauma, double vision and blurred vision.   Gastrointestinal: Negative for abdominal trauma, abdominal pain and rectal bleeding.   Genitourinary: Negative for hematuria, genital trauma and pelvic pain.   Musculoskeletal: Positive for pain, trauma, joint swelling and pain with walking. Negative for abnormal ROM of joint.   Skin: Negative for color change, wound, abrasion and laceration.   Neurological: Negative for dizziness, history of vertigo, light-headedness, coordination disturbances, altered mental status and loss of consciousness.   Hematologic/Lymphatic: Negative for history of bleeding disorder.   Psychiatric/Behavioral: Negative for altered mental status.       Objective:      Physical Exam      Thomas tenderness of right ankle at superior portion of talus. ROM intact.   Assessment:       1. Injury of right ankle, initial encounter    2. Sprain of anterior talofibular ligament of right ankle, initial encounter        Plan:         Injury of right ankle, initial encounter  -     XR ANKLE COMPLETE 3 VIEW RIGHT; Future; Expected date: 02/03/2020    Sprain of anterior talofibular ligament of right ankle,  initial encounter: no evidence of fracture of dislocation. Ice, elevation, NSAIDS discussed. Wrapped in ACE bandage.

## 2020-10-08 ENCOUNTER — OFFICE VISIT (OUTPATIENT)
Dept: URGENT CARE | Facility: CLINIC | Age: 40
End: 2020-10-08
Payer: MEDICAID

## 2020-10-08 VITALS
OXYGEN SATURATION: 98 % | DIASTOLIC BLOOD PRESSURE: 74 MMHG | TEMPERATURE: 97 F | HEART RATE: 92 BPM | SYSTOLIC BLOOD PRESSURE: 116 MMHG

## 2020-10-08 DIAGNOSIS — R52 PAIN: ICD-10-CM

## 2020-10-08 DIAGNOSIS — L03.116 CELLULITIS OF LEFT FOOT: Primary | ICD-10-CM

## 2020-10-08 PROCEDURE — 99214 PR OFFICE/OUTPT VISIT, EST, LEVL IV, 30-39 MIN: ICD-10-PCS | Mod: S$GLB,,, | Performed by: PHYSICIAN ASSISTANT

## 2020-10-08 PROCEDURE — 73630 XR FOOT COMPLETE 3 VIEW LEFT: ICD-10-PCS | Mod: LT,S$GLB,, | Performed by: RADIOLOGY

## 2020-10-08 PROCEDURE — 99214 OFFICE O/P EST MOD 30 MIN: CPT | Mod: S$GLB,,, | Performed by: PHYSICIAN ASSISTANT

## 2020-10-08 PROCEDURE — 73630 X-RAY EXAM OF FOOT: CPT | Mod: LT,S$GLB,, | Performed by: RADIOLOGY

## 2020-10-08 RX ORDER — KETOROLAC TROMETHAMINE 30 MG/ML
30 INJECTION, SOLUTION INTRAMUSCULAR; INTRAVENOUS
Status: COMPLETED | OUTPATIENT
Start: 2020-10-08 | End: 2020-10-08

## 2020-10-08 RX ORDER — CEPHALEXIN 500 MG/1
500 CAPSULE ORAL EVERY 8 HOURS
Qty: 21 CAPSULE | Refills: 0 | Status: SHIPPED | OUTPATIENT
Start: 2020-10-08 | End: 2020-10-15

## 2020-10-08 RX ORDER — IBUPROFEN 800 MG/1
800 TABLET ORAL 3 TIMES DAILY
Qty: 30 TABLET | Refills: 0 | Status: SHIPPED | OUTPATIENT
Start: 2020-10-08 | End: 2020-10-18

## 2020-10-08 RX ADMIN — KETOROLAC TROMETHAMINE 30 MG: 30 INJECTION, SOLUTION INTRAMUSCULAR; INTRAVENOUS at 06:10

## 2020-10-08 NOTE — PATIENT INSTRUCTIONS
General Discharge Instructions     You were given a Toradol shot today in clinic. Do not take any anti-inflammatories (ibuprofen, naproxen, meloxicam) for the next 8 hours.    If you were prescribed a narcotic or controlled medication, do not drive or operate heavy equipment or machinery while taking these medications.  If you were prescribed antibiotics, please take them to completion.  You must understand that you've received an Urgent Care treatment only and that you may be released before all your medical problems are known or treated. You, the patient, will arrange for follow up care as instructed.  Follow up with your PCP or specialty clinic as directed in the next 1-2 weeks if not improved or as needed.  You can call [phone] to schedule an appointment with the appropriate provider.  If your condition worsens we recommend that you receive another evaluation at the emergency room immediately or contact your primary medical clinics after hours call service to discuss your concerns.  Please return here or go to the Emergency Department for any concerns or worsening of condition.      Cellulitis  Cellulitis is an infection of the deep layers of skin. A break in the skin, such as a cut or scratch, can let bacteria under the skin. If the bacteria get to deep layers of the skin, it can be serious. If not treated, cellulitis can get into the bloodstream and lymph nodes. The infection can then spread throughout the body. This causes serious illness.  Cellulitis causes the affected skin to become red, swollen, warm, and sore. The reddened areas have a visible border. An open sore may leak fluid (pus). You may have a fever, chills, and pain.  Cellulitis is treated with antibiotics taken for 7 to 10 days. An open sore may be cleaned and covered with cool wet gauze. Symptoms should get better 1 to 2 days after treatment is started. Make sure to take all the antibiotics for the full number of days until they are  gone. Keep taking the medicine even if your symptoms go away.  Home care  Follow these tips:  · Limit the use of the part of your body with cellulitis.   · If the infection is on your leg, keep your leg raised while sitting. This will help to reduce swelling.  · Take all of the antibiotic medicine exactly as directed until it is gone. Do not miss any doses, especially during the first 7 days. Dont stop taking the medicine when your symptoms get better.  · Keep the affected area clean and dry.  · Wash your hands with soap and warm water before and after touching your skin. Anyone else who touches your skin should also wash his or her hands. Don't share towels.  Follow-up care  Follow up with your healthcare provider, or as advised. If your infection does not go away on the first antibiotic, your healthcare provider will prescribe a different one.  When to seek medical advice  Call your healthcare provider right away if any of these occur:  · Red areas that spread  · Swelling or pain that gets worse  · Fluid leaking from the skin (pus)  · Fever higher of 100.4º F (38.0º C) or higher after 2 days on antibiotics  Date Last Reviewed: 9/1/2016  © 0859-9707 [employer]. [address] [place] [address], [place] [address]. All rights reserved. This information is not intended as a substitute for professional medical care. Always follow your healthcare professional's instructions.

## 2020-10-08 NOTE — PROGRESS NOTES
Subjective:       Patient ID: Daysi Dickye is a 40 y.o. male.    Vitals:  temperature is 97.2 °F (36.2 °C). His blood pressure is 116/74 and his pulse is 92. His oxygen saturation is 98%.     Chief Complaint: Foot Pain    Patient presenting with left foot pain, redness, and swelling x 3-4 days. Reports that he was helping a friend move when he first noticed it. Denies any trauma to the area or insect bite.     Foot Pain  This is a new problem. The current episode started in the past 7 days. The problem occurs constantly. The problem has been gradually worsening. Associated symptoms include myalgias. Pertinent negatives include no arthralgias, chills, diaphoresis, fatigue, fever, joint swelling, numbness, rash or weakness. The symptoms are aggravated by walking and standing. He has tried NSAIDs for the symptoms.       Constitution: Negative for chills, sweating, fatigue and fever.   Musculoskeletal: Positive for pain, pain with walking and muscle ache. Negative for joint pain, joint swelling, abnormal ROM of joint and arthritis.   Skin: Positive for erythema. Negative for color change, rash and wound.   Neurological: Negative for numbness and tingling.       Objective:      Physical Exam   Constitutional: He is oriented to person, place, and time. He appears well-developed.   HENT:   Head: Normocephalic and atraumatic. Head is without abrasion, without contusion and without laceration.   Ears:   Right Ear: External ear normal.   Left Ear: External ear normal.   Nose: Nose normal.   Mouth/Throat: Oropharynx is clear and moist and mucous membranes are normal.   Eyes: Pupils are equal, round, and reactive to light. Conjunctivae, EOM and lids are normal.   Neck: Trachea normal, full passive range of motion without pain and phonation normal. Neck supple.   Cardiovascular: Normal rate, regular rhythm and normal heart sounds.   Pulmonary/Chest: Effort normal and breath sounds normal. No stridor. No respiratory  distress.   Musculoskeletal: Normal range of motion.      Left foot: Tenderness and swelling present.        Feet:    Neurological: He is alert and oriented to person, place, and time.   Skin: Skin is warm, dry, intact and rash. Capillary refill takes less than 2 seconds. Lesions:  erythemaabrasion, burn, bruising and ecchymosisPsychiatric: His speech is normal and behavior is normal. Judgment and thought content normal.   Nursing note and vitals reviewed.      Xr Foot Complete 3 View Left    Result Date: 10/8/2020  EXAMINATION: XR FOOT COMPLETE 3 VIEW LEFT CLINICAL HISTORY: .  Pain, unspecified TECHNIQUE: AP, lateral and oblique views of the left foot were performed. COMPARISON: None FINDINGS: Bones are well mineralized. Overall alignment is within normal limits.  Lisfranc articulation is congruent.  No displaced fracture, dislocation or destructive osseous process.  Joint spaces appear relatively maintained. No subcutaneous emphysema or radiodense retained foreign body.     No acute displaced fracture-dislocation identified. Electronically signed by: Donovan Berry MD Date:    10/08/2020 Time:    17:40    Assessment:       1. Cellulitis of left foot    2. Pain        Plan:         Cellulitis of left foot  -     cephALEXin (KEFLEX) 500 MG capsule; Take 1 capsule (500 mg total) by mouth every 8 (eight) hours. for 7 days  Dispense: 21 capsule; Refill: 0  -     ibuprofen (ADVIL,MOTRIN) 800 MG tablet; Take 1 tablet (800 mg total) by mouth 3 (three) times daily. for 10 days  Dispense: 30 tablet; Refill: 0  -     ketorolac injection 30 mg    Pain  -     XR FOOT COMPLETE 3 VIEW LEFT; Future; Expected date: 10/08/2020      Patient Instructions   General Discharge Instructions     You were given a Toradol shot today in clinic. Do not take any anti-inflammatories (ibuprofen, naproxen, meloxicam) for the next 8 hours.    If you were prescribed a narcotic or controlled medication, do not drive or operate heavy equipment or  machinery while taking these medications.  If you were prescribed antibiotics, please take them to completion.  You must understand that you've received an Urgent Care treatment only and that you may be released before all your medical problems are known or treated. You, the patient, will arrange for follow up care as instructed.  Follow up with your PCP or specialty clinic as directed in the next 1-2 weeks if not improved or as needed.  You can call [phone] to schedule an appointment with the appropriate provider.  If your condition worsens we recommend that you receive another evaluation at the emergency room immediately or contact your primary medical clinics after hours call service to discuss your concerns.  Please return here or go to the Emergency Department for any concerns or worsening of condition.      Cellulitis  Cellulitis is an infection of the deep layers of skin. A break in the skin, such as a cut or scratch, can let bacteria under the skin. If the bacteria get to deep layers of the skin, it can be serious. If not treated, cellulitis can get into the bloodstream and lymph nodes. The infection can then spread throughout the body. This causes serious illness.  Cellulitis causes the affected skin to become red, swollen, warm, and sore. The reddened areas have a visible border. An open sore may leak fluid (pus). You may have a fever, chills, and pain.  Cellulitis is treated with antibiotics taken for 7 to 10 days. An open sore may be cleaned and covered with cool wet gauze. Symptoms should get better 1 to 2 days after treatment is started. Make sure to take all the antibiotics for the full number of days until they are gone. Keep taking the medicine even if your symptoms go away.  Home care  Follow these tips:  · Limit the use of the part of your body with cellulitis.   · If the infection is on your leg, keep your leg raised while sitting. This will help to reduce swelling.  · Take all of the  antibiotic medicine exactly as directed until it is gone. Do not miss any doses, especially during the first 7 days. Dont stop taking the medicine when your symptoms get better.  · Keep the affected area clean and dry.  · Wash your hands with soap and warm water before and after touching your skin. Anyone else who touches your skin should also wash his or her hands. Don't share towels.  Follow-up care  Follow up with your healthcare provider, or as advised. If your infection does not go away on the first antibiotic, your healthcare provider will prescribe a different one.  When to seek medical advice  Call your healthcare provider right away if any of these occur:  · Red areas that spread  · Swelling or pain that gets worse  · Fluid leaking from the skin (pus)  · Fever higher of 100.4º F (38.0º C) or higher after 2 days on antibiotics  Date Last Reviewed: 9/1/2016  © 2211-5430 [employer] [address] [place] [address], [place] [address]. All rights reserved. This information is not intended as a substitute for professional medical care. Always follow your healthcare professional's instructions.
